# Patient Record
Sex: MALE | Race: WHITE | Employment: OTHER | URBAN - METROPOLITAN AREA
[De-identification: names, ages, dates, MRNs, and addresses within clinical notes are randomized per-mention and may not be internally consistent; named-entity substitution may affect disease eponyms.]

---

## 2017-02-07 PROBLEM — I50.22 CHRONIC SYSTOLIC CHF (CONGESTIVE HEART FAILURE) (HCC): Status: ACTIVE | Noted: 2017-02-07

## 2017-02-07 PROBLEM — I34.0 MODERATE MITRAL REGURGITATION: Status: ACTIVE | Noted: 2017-02-07

## 2017-02-07 PROBLEM — I42.0 NONISCHEMIC DILATED CARDIOMYOPATHY (HCC): Status: ACTIVE | Noted: 2017-02-07

## 2017-04-06 ENCOUNTER — HOSPITAL ENCOUNTER (EMERGENCY)
Facility: HOSPITAL | Age: 62
Discharge: HOME | End: 2017-04-06
Attending: EMERGENCY MEDICINE
Payer: COMMERCIAL

## 2017-04-06 ENCOUNTER — APPOINTMENT (OUTPATIENT)
Dept: CT IMAGING | Facility: HOSPITAL | Age: 62
End: 2017-04-06
Attending: EMERGENCY MEDICINE
Payer: COMMERCIAL

## 2017-04-06 VITALS
HEART RATE: 84 BPM | SYSTOLIC BLOOD PRESSURE: 142 MMHG | TEMPERATURE: 97 F | RESPIRATION RATE: 16 BRPM | WEIGHT: 180 LBS | OXYGEN SATURATION: 98 % | BODY MASS INDEX: 25.77 KG/M2 | HEIGHT: 70 IN | DIASTOLIC BLOOD PRESSURE: 82 MMHG

## 2017-04-06 DIAGNOSIS — N20.0 KIDNEY STONE: Primary | ICD-10-CM

## 2017-04-06 PROCEDURE — 96376 TX/PRO/DX INJ SAME DRUG ADON: CPT

## 2017-04-06 PROCEDURE — 99284 EMERGENCY DEPT VISIT MOD MDM: CPT

## 2017-04-06 PROCEDURE — 80053 COMPREHEN METABOLIC PANEL: CPT | Performed by: EMERGENCY MEDICINE

## 2017-04-06 PROCEDURE — 85025 COMPLETE CBC W/AUTO DIFF WBC: CPT | Performed by: EMERGENCY MEDICINE

## 2017-04-06 PROCEDURE — 96374 THER/PROPH/DIAG INJ IV PUSH: CPT

## 2017-04-06 PROCEDURE — 96375 TX/PRO/DX INJ NEW DRUG ADDON: CPT

## 2017-04-06 PROCEDURE — 81001 URINALYSIS AUTO W/SCOPE: CPT | Performed by: EMERGENCY MEDICINE

## 2017-04-06 PROCEDURE — 96361 HYDRATE IV INFUSION ADD-ON: CPT

## 2017-04-06 PROCEDURE — 83690 ASSAY OF LIPASE: CPT | Performed by: EMERGENCY MEDICINE

## 2017-04-06 PROCEDURE — 74176 CT ABD & PELVIS W/O CONTRAST: CPT

## 2017-04-06 RX ORDER — HYDROCODONE BITARTRATE AND ACETAMINOPHEN 10; 325 MG/1; MG/1
1 TABLET ORAL EVERY 4 HOURS PRN
Qty: 20 TABLET | Refills: 0 | Status: SHIPPED | OUTPATIENT
Start: 2017-04-06 | End: 2017-05-11

## 2017-04-06 RX ORDER — ONDANSETRON 4 MG/1
4 TABLET, ORALLY DISINTEGRATING ORAL EVERY 4 HOURS PRN
Qty: 20 TABLET | Refills: 0 | Status: SHIPPED | OUTPATIENT
Start: 2017-04-06 | End: 2017-05-11

## 2017-04-06 RX ORDER — HYDROMORPHONE HYDROCHLORIDE 1 MG/ML
1 INJECTION, SOLUTION INTRAMUSCULAR; INTRAVENOUS; SUBCUTANEOUS EVERY 30 MIN PRN
Status: COMPLETED | OUTPATIENT
Start: 2017-04-06 | End: 2017-04-06

## 2017-04-06 RX ORDER — TAMSULOSIN HYDROCHLORIDE 0.4 MG/1
0.4 CAPSULE ORAL DAILY
Qty: 7 CAPSULE | Refills: 0 | Status: SHIPPED | OUTPATIENT
Start: 2017-04-06 | End: 2017-04-13

## 2017-04-06 RX ORDER — ONDANSETRON 2 MG/ML
4 INJECTION INTRAMUSCULAR; INTRAVENOUS ONCE
Status: COMPLETED | OUTPATIENT
Start: 2017-04-06 | End: 2017-04-06

## 2017-04-06 RX ORDER — HYDROMORPHONE HYDROCHLORIDE 1 MG/ML
1 INJECTION, SOLUTION INTRAMUSCULAR; INTRAVENOUS; SUBCUTANEOUS ONCE
Status: COMPLETED | OUTPATIENT
Start: 2017-04-06 | End: 2017-04-06

## 2017-04-06 NOTE — ED PROVIDER NOTES
Patient Seen in: [facility] Emergency Department    History   Patient presents with:  Abdomen/Flank Pain (GI/)    Stated Complaint: abd pain    HPI    This is a 42-year-old male who awoke with right flank pain radiating to his right side of his ab (400 mg total) by mouth daily. lisinopril 10 MG Oral Tab,  Take 1 tablet (10 mg total) by mouth daily. furosemide 20 MG Oral Tab,  Take 1 tablet (20 mg total) by mouth daily.    Metoprolol Succinate ER 25 MG Oral Tablet 24 Hr,  Take 1 tablet (25 mg tota reproduce any specific tenderness in his abdomen his abdomen is soft. EXT: There is good pulses bilaterally. There is no calf tenderness. There is no rash noted. There is no edema    NEURO: Alert and oriented x3.   Muscle strength and sensory exam is gr renal calculus. .           Urinalysis did show blood but no findings of urinary tract infection. The comprehensive shows a creatinine slightly high at 1.42. Slightly higher than normal for him.   He was given IV fluids probably secondary to the nausea and

## 2017-04-06 NOTE — ED INITIAL ASSESSMENT (HPI)
Pt reports waking up with right flank and back pain. He states he has a history of kidney stones and feels like this may also be a kidney stone.

## 2017-04-07 ENCOUNTER — APPOINTMENT (OUTPATIENT)
Dept: GENERAL RADIOLOGY | Facility: HOSPITAL | Age: 62
End: 2017-04-07
Attending: EMERGENCY MEDICINE
Payer: COMMERCIAL

## 2017-04-07 ENCOUNTER — HOSPITAL ENCOUNTER (EMERGENCY)
Facility: HOSPITAL | Age: 62
Discharge: HOME | End: 2017-04-07
Attending: EMERGENCY MEDICINE
Payer: COMMERCIAL

## 2017-04-07 VITALS
DIASTOLIC BLOOD PRESSURE: 78 MMHG | SYSTOLIC BLOOD PRESSURE: 130 MMHG | OXYGEN SATURATION: 98 % | RESPIRATION RATE: 16 BRPM | HEART RATE: 68 BPM

## 2017-04-07 DIAGNOSIS — N20.0 KIDNEY STONE: Primary | ICD-10-CM

## 2017-04-07 PROCEDURE — 99285 EMERGENCY DEPT VISIT HI MDM: CPT

## 2017-04-07 PROCEDURE — 80048 BASIC METABOLIC PNL TOTAL CA: CPT | Performed by: EMERGENCY MEDICINE

## 2017-04-07 PROCEDURE — 96374 THER/PROPH/DIAG INJ IV PUSH: CPT

## 2017-04-07 PROCEDURE — 96361 HYDRATE IV INFUSION ADD-ON: CPT

## 2017-04-07 PROCEDURE — 96375 TX/PRO/DX INJ NEW DRUG ADDON: CPT

## 2017-04-07 PROCEDURE — 85025 COMPLETE CBC W/AUTO DIFF WBC: CPT | Performed by: EMERGENCY MEDICINE

## 2017-04-07 PROCEDURE — 81001 URINALYSIS AUTO W/SCOPE: CPT | Performed by: EMERGENCY MEDICINE

## 2017-04-07 PROCEDURE — 99284 EMERGENCY DEPT VISIT MOD MDM: CPT

## 2017-04-07 PROCEDURE — 74000 XR ABDOMEN (KUB) (1 AP VIEW)  (CPT=74000): CPT

## 2017-04-07 RX ORDER — HYDROMORPHONE HYDROCHLORIDE 1 MG/ML
1 INJECTION, SOLUTION INTRAMUSCULAR; INTRAVENOUS; SUBCUTANEOUS ONCE
Status: COMPLETED | OUTPATIENT
Start: 2017-04-07 | End: 2017-04-07

## 2017-04-07 RX ORDER — HYDROCODONE BITARTRATE AND ACETAMINOPHEN 7.5; 325 MG/1; MG/1
1-2 TABLET ORAL EVERY 4 HOURS PRN
Qty: 20 TABLET | Refills: 0 | Status: SHIPPED | OUTPATIENT
Start: 2017-04-07 | End: 2017-04-14

## 2017-04-07 RX ORDER — IBUPROFEN 600 MG/1
600 TABLET ORAL EVERY 8 HOURS PRN
Qty: 30 TABLET | Refills: 0 | Status: SHIPPED | OUTPATIENT
Start: 2017-04-07 | End: 2017-04-14

## 2017-04-07 RX ORDER — SODIUM CHLORIDE 9 MG/ML
INJECTION, SOLUTION INTRAVENOUS ONCE
Status: COMPLETED | OUTPATIENT
Start: 2017-04-07 | End: 2017-04-07

## 2017-04-07 RX ORDER — KETOROLAC TROMETHAMINE 30 MG/ML
15 INJECTION, SOLUTION INTRAMUSCULAR; INTRAVENOUS ONCE
Status: COMPLETED | OUTPATIENT
Start: 2017-04-07 | End: 2017-04-07

## 2017-04-07 NOTE — ED PROVIDER NOTES
Patient Seen in: [facility] Emergency Department    History   Patient presents with:  Abdomen/Flank Pain (GI/)    Stated Complaint: renal colic    HPI    29-FSRU-TOG male with a history of lupus (not currently on prednisone close (and biventricula mg total) by mouth every 4 (four) hours as needed for Nausea. magnesium oxide 400 MG Oral Tab,  Take 1 tablet (400 mg total) by mouth daily. lisinopril 10 MG Oral Tab,  Take 1 tablet (10 mg total) by mouth daily.    furosemide 20 MG Oral Tab,  Take 1 ta Glucose 136 (*)     Creatinine 1.58 (*)     GFR 47 (*)     Sodium 135 (*)     All other components within normal limits   URINALYSIS WITH CULTURE REFLEX - Abnormal; Notable for the following:     Blood Urine Moderate (*)     RBC URINE >10 (*)     Hyaline C 4/07/2017 at 16:27     Approved by: Herberth Tucker MD            [facility] [place]     Patient hydrated with normal saline. He was given Dilaudid for pain control. He was also given 15 mg of Toradol.     Case discussed with urologist.  He does not want a CT scan janna

## 2017-04-10 PROBLEM — N20.1 RIGHT URETERAL STONE: Status: ACTIVE | Noted: 2017-04-10

## 2017-05-11 ENCOUNTER — APPOINTMENT (OUTPATIENT)
Dept: LAB | Age: 62
End: 2017-05-11
Attending: NURSE PRACTITIONER
Payer: COMMERCIAL

## 2017-05-11 DIAGNOSIS — R79.89 ELEVATED SERUM CREATININE: ICD-10-CM

## 2017-05-11 DIAGNOSIS — I42.0 DILATED CARDIOMYOPATHY (HCC): ICD-10-CM

## 2017-05-11 DIAGNOSIS — I50.22 CHRONIC SYSTOLIC CONGESTIVE HEART FAILURE (HCC): ICD-10-CM

## 2017-05-11 PROCEDURE — 80048 BASIC METABOLIC PNL TOTAL CA: CPT

## 2017-05-11 PROCEDURE — 36415 COLL VENOUS BLD VENIPUNCTURE: CPT

## 2017-06-25 ENCOUNTER — APPOINTMENT (OUTPATIENT)
Dept: CT IMAGING | Facility: HOSPITAL | Age: 62
End: 2017-06-25
Attending: EMERGENCY MEDICINE
Payer: COMMERCIAL

## 2017-06-25 ENCOUNTER — HOSPITAL ENCOUNTER (EMERGENCY)
Facility: HOSPITAL | Age: 62
Discharge: HOME | End: 2017-06-25
Attending: EMERGENCY MEDICINE
Payer: COMMERCIAL

## 2017-06-25 VITALS
WEIGHT: 185 LBS | OXYGEN SATURATION: 100 % | HEART RATE: 71 BPM | RESPIRATION RATE: 18 BRPM | DIASTOLIC BLOOD PRESSURE: 75 MMHG | BODY MASS INDEX: 26.48 KG/M2 | HEIGHT: 70 IN | SYSTOLIC BLOOD PRESSURE: 119 MMHG | TEMPERATURE: 98 F

## 2017-06-25 DIAGNOSIS — N23 RENAL COLIC: Primary | ICD-10-CM

## 2017-06-25 PROCEDURE — 96376 TX/PRO/DX INJ SAME DRUG ADON: CPT

## 2017-06-25 PROCEDURE — 80053 COMPREHEN METABOLIC PANEL: CPT

## 2017-06-25 PROCEDURE — 99284 EMERGENCY DEPT VISIT MOD MDM: CPT

## 2017-06-25 PROCEDURE — 81003 URINALYSIS AUTO W/O SCOPE: CPT | Performed by: EMERGENCY MEDICINE

## 2017-06-25 PROCEDURE — 85025 COMPLETE CBC W/AUTO DIFF WBC: CPT | Performed by: EMERGENCY MEDICINE

## 2017-06-25 PROCEDURE — 74176 CT ABD & PELVIS W/O CONTRAST: CPT | Performed by: EMERGENCY MEDICINE

## 2017-06-25 PROCEDURE — 83690 ASSAY OF LIPASE: CPT

## 2017-06-25 PROCEDURE — 80053 COMPREHEN METABOLIC PANEL: CPT | Performed by: EMERGENCY MEDICINE

## 2017-06-25 PROCEDURE — 99285 EMERGENCY DEPT VISIT HI MDM: CPT

## 2017-06-25 PROCEDURE — 96374 THER/PROPH/DIAG INJ IV PUSH: CPT

## 2017-06-25 PROCEDURE — 83690 ASSAY OF LIPASE: CPT | Performed by: EMERGENCY MEDICINE

## 2017-06-25 PROCEDURE — 96375 TX/PRO/DX INJ NEW DRUG ADDON: CPT

## 2017-06-25 PROCEDURE — 85025 COMPLETE CBC W/AUTO DIFF WBC: CPT

## 2017-06-25 RX ORDER — ONDANSETRON 4 MG/1
4 TABLET, ORALLY DISINTEGRATING ORAL EVERY 4 HOURS PRN
Qty: 10 TABLET | Refills: 0 | Status: SHIPPED | OUTPATIENT
Start: 2017-06-25 | End: 2017-07-02

## 2017-06-25 RX ORDER — HYDROCODONE BITARTRATE AND ACETAMINOPHEN 5; 325 MG/1; MG/1
1-2 TABLET ORAL EVERY 4 HOURS PRN
Qty: 20 TABLET | Refills: 0 | Status: SHIPPED | OUTPATIENT
Start: 2017-06-25 | End: 2017-07-02

## 2017-06-25 RX ORDER — ONDANSETRON 2 MG/ML
4 INJECTION INTRAMUSCULAR; INTRAVENOUS ONCE
Status: COMPLETED | OUTPATIENT
Start: 2017-06-25 | End: 2017-06-25

## 2017-06-25 RX ORDER — HYDROMORPHONE HYDROCHLORIDE 1 MG/ML
0.5 INJECTION, SOLUTION INTRAMUSCULAR; INTRAVENOUS; SUBCUTANEOUS EVERY 30 MIN PRN
Status: DISCONTINUED | OUTPATIENT
Start: 2017-06-25 | End: 2017-06-25

## 2017-06-25 NOTE — ED PROVIDER NOTES
Patient Seen in: [facility] Emergency Department    History   Patient presents with:  Abdomen/Flank Pain (GI/)    Stated Complaint: kidney stone pain    HPI    60-year-old male with a history of congestive heart failure, coronary disease, lupus, h spironolactone 25 MG Oral Tab,  Take 1 tablet (25 mg total) by mouth daily. furosemide 20 MG Oral Tab,  Take 1 tablet (20 mg total) by mouth daily.        Family History   Problem Relation Age of Onset   • Cancer Father    • Cancer Mother    • Heart Disor are intact. There is no focal motor deficit noted. Skin exam: Warm to touch. Good skin turgor. No lacerations, abrasions, lesions noted.     ED Course     Labs Reviewed   COMP METABOLIC PANEL (14) - Abnormal; Notable for the following:        Result Marisa Registry. PATIENT STATED HISTORY: (As transcribed by Technologist)     FINDINGS:  KIDNEYS:  There is mild right hydronephrosis and hydroureter caused by a 5 mm stone within the right distal ureter at the UVJ.  They're nonobstructing 2 mm stones within the He is instructed to return to the emergency department if there is any worsening symptoms or any new concerns. Findings  of the patient's tests results were discussed with the patient in detail.   They were understanding of these results and their discha

## 2017-06-27 ENCOUNTER — HOSPITAL ENCOUNTER (OUTPATIENT)
Facility: HOSPITAL | Age: 62
Setting detail: HOSPITAL OUTPATIENT SURGERY
Discharge: HOME | End: 2017-06-27
Attending: UROLOGY | Admitting: UROLOGY
Payer: COMMERCIAL

## 2017-06-27 ENCOUNTER — APPOINTMENT (OUTPATIENT)
Dept: GENERAL RADIOLOGY | Facility: HOSPITAL | Age: 62
End: 2017-06-27
Attending: UROLOGY
Payer: COMMERCIAL

## 2017-06-27 ENCOUNTER — SURGERY (OUTPATIENT)
Age: 62
End: 2017-06-27

## 2017-06-27 ENCOUNTER — ANESTHESIA (OUTPATIENT)
Dept: SURGERY | Facility: HOSPITAL | Age: 62
End: 2017-06-27
Payer: COMMERCIAL

## 2017-06-27 ENCOUNTER — ANESTHESIA EVENT (OUTPATIENT)
Dept: SURGERY | Facility: HOSPITAL | Age: 62
End: 2017-06-27
Payer: COMMERCIAL

## 2017-06-27 VITALS
HEIGHT: 70 IN | WEIGHT: 183.63 LBS | SYSTOLIC BLOOD PRESSURE: 136 MMHG | OXYGEN SATURATION: 97 % | TEMPERATURE: 98 F | RESPIRATION RATE: 16 BRPM | BODY MASS INDEX: 26.29 KG/M2 | HEART RATE: 64 BPM | DIASTOLIC BLOOD PRESSURE: 77 MMHG

## 2017-06-27 DIAGNOSIS — N20.1 RIGHT URETERAL CALCULUS: ICD-10-CM

## 2017-06-27 LAB
ATRIAL RATE: 65 BPM
P AXIS: 19 DEGREES
P-R INTERVAL: 146 MS
Q-T INTERVAL: 436 MS
QRS DURATION: 82 MS
QTC CALCULATION (BEZET): 453 MS
R AXIS: -15 DEGREES
T AXIS: -26 DEGREES
VENTRICULAR RATE: 65 BPM

## 2017-06-27 PROCEDURE — 0TF68ZZ FRAGMENTATION IN RIGHT URETER, VIA NATURAL OR ARTIFICIAL OPENING ENDOSCOPIC: ICD-10-PCS | Performed by: UROLOGY

## 2017-06-27 PROCEDURE — 93010 ELECTROCARDIOGRAM REPORT: CPT | Performed by: INTERNAL MEDICINE

## 2017-06-27 PROCEDURE — 76000 FLUOROSCOPY <1 HR PHYS/QHP: CPT | Performed by: UROLOGY

## 2017-06-27 PROCEDURE — 0T768DZ DILATION OF RIGHT URETER WITH INTRALUMINAL DEVICE, VIA NATURAL OR ARTIFICIAL OPENING ENDOSCOPIC: ICD-10-PCS | Performed by: UROLOGY

## 2017-06-27 PROCEDURE — 82365 CALCULUS SPECTROSCOPY: CPT | Performed by: UROLOGY

## 2017-06-27 PROCEDURE — 93005 ELECTROCARDIOGRAM TRACING: CPT

## 2017-06-27 DEVICE — STENT CONTOUR URET 6X26: Type: IMPLANTABLE DEVICE | Site: URETER | Status: FUNCTIONAL

## 2017-06-27 RX ORDER — HYDROCODONE BITARTRATE AND ACETAMINOPHEN 5; 325 MG/1; MG/1
2 TABLET ORAL AS NEEDED
Status: DISCONTINUED | OUTPATIENT
Start: 2017-06-27 | End: 2017-08-04

## 2017-06-27 RX ORDER — METOCLOPRAMIDE HYDROCHLORIDE 5 MG/ML
10 INJECTION INTRAMUSCULAR; INTRAVENOUS AS NEEDED
Status: DISCONTINUED | OUTPATIENT
Start: 2017-06-27 | End: 2017-08-04

## 2017-06-27 RX ORDER — CEFAZOLIN SODIUM 1 G/3ML
INJECTION, POWDER, FOR SOLUTION INTRAMUSCULAR; INTRAVENOUS
Status: DISCONTINUED | OUTPATIENT
Start: 2017-06-27 | End: 2017-06-27 | Stop reason: HOSPADM

## 2017-06-27 RX ORDER — SODIUM CHLORIDE, SODIUM LACTATE, POTASSIUM CHLORIDE, CALCIUM CHLORIDE 600; 310; 30; 20 MG/100ML; MG/100ML; MG/100ML; MG/100ML
INJECTION, SOLUTION INTRAVENOUS CONTINUOUS
Status: DISCONTINUED | OUTPATIENT
Start: 2017-06-27 | End: 2017-08-04

## 2017-06-27 RX ORDER — ONDANSETRON 2 MG/ML
4 INJECTION INTRAMUSCULAR; INTRAVENOUS AS NEEDED
Status: DISCONTINUED | OUTPATIENT
Start: 2017-06-27 | End: 2017-08-04

## 2017-06-27 RX ORDER — DIAZEPAM 5 MG/1
5 TABLET ORAL EVERY 8 HOURS PRN
Status: DISCONTINUED | OUTPATIENT
Start: 2017-06-27 | End: 2017-08-04

## 2017-06-27 RX ORDER — NALOXONE HYDROCHLORIDE 0.4 MG/ML
80 INJECTION, SOLUTION INTRAMUSCULAR; INTRAVENOUS; SUBCUTANEOUS AS NEEDED
Status: ACTIVE | OUTPATIENT
Start: 2017-06-27 | End: 2017-06-27

## 2017-06-27 RX ORDER — METHENAMINE, SODIUM PHOSPHATE, MONOBASIC, MONOHYDRATE, PHENYL SALICYLATE, METHYLENE BLUE, AND HYOSCYAMINE SULFATE 120; 40.8; 36; 10; .12 MG/1; MG/1; MG/1; MG/1; MG/1
1 CAPSULE ORAL 3 TIMES DAILY
Qty: 15 CAPSULE | Refills: 3 | Status: SHIPPED | OUTPATIENT
Start: 2017-06-27 | End: 2017-07-12

## 2017-06-27 RX ORDER — MEPERIDINE HYDROCHLORIDE 25 MG/ML
12.5 INJECTION INTRAMUSCULAR; INTRAVENOUS; SUBCUTANEOUS AS NEEDED
Status: DISCONTINUED | OUTPATIENT
Start: 2017-06-27 | End: 2017-08-04

## 2017-06-27 RX ORDER — HYDROCODONE BITARTRATE AND ACETAMINOPHEN 5; 325 MG/1; MG/1
1 TABLET ORAL AS NEEDED
Status: DISCONTINUED | OUTPATIENT
Start: 2017-06-27 | End: 2017-08-04

## 2017-06-27 RX ORDER — HYDROMORPHONE HYDROCHLORIDE 1 MG/ML
0.4 INJECTION, SOLUTION INTRAMUSCULAR; INTRAVENOUS; SUBCUTANEOUS EVERY 5 MIN PRN
Status: ACTIVE | OUTPATIENT
Start: 2017-06-27 | End: 2017-06-27

## 2017-06-27 RX ORDER — MIDAZOLAM HYDROCHLORIDE 1 MG/ML
1 INJECTION INTRAMUSCULAR; INTRAVENOUS EVERY 5 MIN PRN
Status: DISCONTINUED | OUTPATIENT
Start: 2017-06-27 | End: 2017-08-04

## 2017-06-27 RX ORDER — TAMSULOSIN HYDROCHLORIDE 0.4 MG/1
0.4 CAPSULE ORAL EVERY EVENING
Qty: 10 CAPSULE | Refills: 0 | Status: SHIPPED | OUTPATIENT
Start: 2017-06-27 | End: 2017-07-07

## 2017-06-27 RX ORDER — LEVOFLOXACIN 500 MG/1
500 TABLET, FILM COATED ORAL DAILY
Qty: 5 TABLET | Refills: 0 | Status: SHIPPED | OUTPATIENT
Start: 2017-06-27 | End: 2017-06-29

## 2017-06-27 RX ORDER — LIDOCAINE HYDROCHLORIDE 20 MG/ML
JELLY TOPICAL AS NEEDED
Status: DISCONTINUED | OUTPATIENT
Start: 2017-06-27 | End: 2017-06-27 | Stop reason: HOSPADM

## 2017-06-27 NOTE — H&P
History & Physical Examination    Patient Name: Edmond Robin  MRN: [id number]  [facility]: [id number]  YOB: 1955    Diagnosis: right ureteral stone with obstruction    Present Illness:  right ureteral stone with obstruction      Prescriptions Prior ANGIO*  No date: COLONOSCOPY  No date: OTHER SURGICAL HISTORY      Comment: lithotripsy  7/2/15: OTHER SURGICAL HISTORY      Comment: Cysto/ stent removal  Dr. Leung Mins  No date: DIONI - CARDIO (EXTERNAL)  Family History   Problem Relation Age of Onset   • Canc

## 2017-06-27 NOTE — ANESTHESIA PREPROCEDURE EVALUATION
PRE-OP EVALUATION    Patient Name: Huy Dangelo    Pre-op Diagnosis: Right Ureteral Calculus    Procedure(s):  CYSTOSCOPY, RIGHT URETEROSCOPY, LASER LITHOTRIPSY      Surgeon(s) and Role:     Marcelino Gonzalez MD - Primary    Pre-op vitals reviewed.   Tem with pronounced hypokinesis of the septal wall. 3. This is a normal exercise EKG.        elisha Muhammad MD 11/23/2016 17:08  t   [id number] 11/25/2016 12:29 PM #[id number]    ECG reviewed.   Exercise tolerance: good     MET: >4                   (+) valvular GLU 96 06/25/2017   CA 8.6 06/25/2017            Airway      Mallampati: II  Mouth opening: >3 FB  TM distance: 4 - 6 cm  Neck ROM: full Cardiovascular      Rhythm: regular  Rate: normal     Dental      Dental appliance(s): upper dentures       Pulmonary

## 2017-06-27 NOTE — ANESTHESIA POSTPROCEDURE EVALUATION
1416 Regino Shi Patient Status:  Hospital Outpatient Surgery   Age/Gender 58year old male MRN [id number]   [facility] SURGERY Attending Naye Doyle MD   Hosp Day # 0 PCP María Castro MD       Anesthesia Post-op N

## 2017-06-27 NOTE — BRIEF OP NOTE
Pre-Operative Diagnosis: Right Ureteral Calculus     Post-Operative Diagnosis: Right Ureteral Calculus     Procedure Performed:   Procedure(s):  CYSTOSCOPY, RIGHT URETEROSCOPY, LASER LITHOTRIPSY, RIGHT URETERAL STENT, FLUOROSCOPY      Surgeon(s) and Rol

## 2017-06-28 NOTE — OPERATIVE REPORT
[facility]    PATIENT'S NAME: Nabeel Verma   ATTENDING PHYSICIAN: Carolina Valdovinos M.D. OPERATING PHYSICIAN: Carolina Valdovinos M.D.    PATIENT ACCOUNT#:   [de-identified]    LOCATION:  PREOPASCC  PRE ASCC 6 EDWP 10  MEDICAL RECORD #:   [id number]       DATE OF fluoroscopic guidance. The cystoscope and open-ended catheter were then withdrawn and a Linden dilator was used to dilate the ureter.   A semi-rigid ureteroscope was then advanced through the urethra into the bladder into the distal ureter with a secon

## 2017-06-30 LAB
CALCULI MASS: 4 MG
CALCULI NUMBER: 1

## 2017-08-04 ENCOUNTER — APPOINTMENT (OUTPATIENT)
Dept: GENERAL RADIOLOGY | Facility: HOSPITAL | Age: 62
DRG: 227 | End: 2017-08-04
Attending: EMERGENCY MEDICINE
Payer: COMMERCIAL

## 2017-08-04 ENCOUNTER — APPOINTMENT (OUTPATIENT)
Dept: CT IMAGING | Facility: HOSPITAL | Age: 62
DRG: 227 | End: 2017-08-04
Attending: EMERGENCY MEDICINE
Payer: COMMERCIAL

## 2017-08-04 ENCOUNTER — HOSPITAL ENCOUNTER (INPATIENT)
Facility: HOSPITAL | Age: 62
LOS: 4 days | Discharge: HOME | DRG: 227 | End: 2017-08-09
Attending: EMERGENCY MEDICINE | Admitting: HOSPITALIST
Payer: COMMERCIAL

## 2017-08-04 DIAGNOSIS — N17.9 ACUTE RENAL FAILURE, UNSPECIFIED ACUTE RENAL FAILURE TYPE (HCC): ICD-10-CM

## 2017-08-04 DIAGNOSIS — R55 SYNCOPE AND COLLAPSE: ICD-10-CM

## 2017-08-04 DIAGNOSIS — T79.6XXA TRAUMATIC RHABDOMYOLYSIS, INITIAL ENCOUNTER (HCC): ICD-10-CM

## 2017-08-04 DIAGNOSIS — R77.8 TROPONIN LEVEL ELEVATED: Primary | ICD-10-CM

## 2017-08-04 LAB
ALBUMIN SERPL-MCNC: 3.1 G/DL (ref 3.5–4.8)
ALP LIVER SERPL-CCNC: 95 U/L (ref 45–117)
ALT SERPL-CCNC: 118 U/L (ref 17–63)
AST SERPL-CCNC: 284 U/L (ref 15–41)
BASOPHILS # BLD AUTO: 0.02 X10(3) UL (ref 0–0.1)
BASOPHILS NFR BLD AUTO: 0.2 %
BILIRUB SERPL-MCNC: 3 MG/DL (ref 0.1–2)
BUN BLD-MCNC: 50 MG/DL (ref 8–20)
CALCIUM BLD-MCNC: 8.6 MG/DL (ref 8.3–10.3)
CHLORIDE: 103 MMOL/L (ref 101–111)
CK: 2937 IU/L (ref 39–308)
CKMB: 8.3 NG/ML (ref 0–5)
CO2: 21 MMOL/L (ref 22–32)
CREAT BLD-MCNC: 1.61 MG/DL (ref 0.7–1.3)
EOSINOPHIL # BLD AUTO: 0.03 X10(3) UL (ref 0–0.3)
EOSINOPHIL NFR BLD AUTO: 0.3 %
ERYTHROCYTE [DISTWIDTH] IN BLOOD BY AUTOMATED COUNT: 11.9 % (ref 11.5–16)
GLUCOSE BLD-MCNC: 68 MG/DL (ref 70–99)
HCT VFR BLD AUTO: 43.5 % (ref 37–53)
HGB BLD-MCNC: 14.8 G/DL (ref 13–17)
IMMATURE GRANULOCYTE COUNT: 0.1 X10(3) UL (ref 0–1)
IMMATURE GRANULOCYTE RATIO %: 1.1 %
LYMPHOCYTES # BLD AUTO: 0.9 X10(3) UL (ref 0.9–4)
LYMPHOCYTES NFR BLD AUTO: 9.6 %
M PROTEIN MFR SERPL ELPH: 7.3 G/DL (ref 6.1–8.3)
MB INDEX: <1 % (ref ?–4)
MCH RBC QN AUTO: 31.2 PG (ref 27–33.2)
MCHC RBC AUTO-ENTMCNC: 34 G/DL (ref 31–37)
MCV RBC AUTO: 91.6 FL (ref 80–99)
MONOCYTES # BLD AUTO: 0.89 X10(3) UL (ref 0.1–0.6)
MONOCYTES NFR BLD AUTO: 9.5 %
NEUTROPHIL ABS PRELIM: 7.41 X10 (3) UL (ref 1.3–6.7)
NEUTROPHILS # BLD AUTO: 7.41 X10(3) UL (ref 1.3–6.7)
NEUTROPHILS NFR BLD AUTO: 79.3 %
PLATELET # BLD AUTO: 194 10(3)UL (ref 150–450)
POTASSIUM SERPL-SCNC: 4.1 MMOL/L (ref 3.6–5.1)
RBC # BLD AUTO: 4.75 X10(6)UL (ref 4.3–5.7)
RED CELL DISTRIBUTION WIDTH-SD: 39.9 FL (ref 35.1–46.3)
SODIUM SERPL-SCNC: 138 MMOL/L (ref 136–144)
TROPONIN: 0.12 NG/ML (ref ?–0.05)
WBC # BLD AUTO: 9.4 X10(3) UL (ref 4–13)

## 2017-08-04 PROCEDURE — 71010 XR CHEST AP PORTABLE  (CPT=71010): CPT | Performed by: EMERGENCY MEDICINE

## 2017-08-04 PROCEDURE — 93010 ELECTROCARDIOGRAM REPORT: CPT | Performed by: INTERNAL MEDICINE

## 2017-08-04 PROCEDURE — 70450 CT HEAD/BRAIN W/O DYE: CPT | Performed by: EMERGENCY MEDICINE

## 2017-08-04 RX ORDER — MORPHINE SULFATE 4 MG/ML
4 INJECTION, SOLUTION INTRAMUSCULAR; INTRAVENOUS ONCE
Status: COMPLETED | OUTPATIENT
Start: 2017-08-04 | End: 2017-08-04

## 2017-08-05 ENCOUNTER — APPOINTMENT (OUTPATIENT)
Dept: CV DIAGNOSTICS | Facility: HOSPITAL | Age: 62
DRG: 227 | End: 2017-08-05
Attending: HOSPITALIST
Payer: COMMERCIAL

## 2017-08-05 ENCOUNTER — APPOINTMENT (OUTPATIENT)
Dept: ULTRASOUND IMAGING | Facility: HOSPITAL | Age: 62
DRG: 227 | End: 2017-08-05
Attending: HOSPITALIST
Payer: COMMERCIAL

## 2017-08-05 PROBLEM — R55 SYNCOPE AND COLLAPSE: Status: ACTIVE | Noted: 2017-08-05

## 2017-08-05 PROBLEM — T79.6XXA TRAUMATIC RHABDOMYOLYSIS, INITIAL ENCOUNTER (HCC): Status: ACTIVE | Noted: 2017-08-05

## 2017-08-05 PROBLEM — N17.9 ACUTE RENAL FAILURE, UNSPECIFIED ACUTE RENAL FAILURE TYPE (HCC): Status: ACTIVE | Noted: 2017-08-05

## 2017-08-05 LAB
ALBUMIN SERPL-MCNC: 2.7 G/DL (ref 3.5–4.8)
ALP LIVER SERPL-CCNC: 78 U/L (ref 45–117)
ALT SERPL-CCNC: 96 U/L (ref 17–63)
AST SERPL-CCNC: 191 U/L (ref 15–41)
ATRIAL RATE: 87 BPM
ATRIAL RATE: 90 BPM
ATRIAL RATE: 93 BPM
BASOPHILS # BLD AUTO: 0.01 X10(3) UL (ref 0–0.1)
BASOPHILS NFR BLD AUTO: 0.1 %
BILIRUB SERPL-MCNC: 2.5 MG/DL (ref 0.1–2)
BUN BLD-MCNC: 44 MG/DL (ref 8–20)
CALCIUM BLD-MCNC: 7.9 MG/DL (ref 8.3–10.3)
CHLORIDE: 109 MMOL/L (ref 101–111)
CO2: 21 MMOL/L (ref 22–32)
CREAT BLD-MCNC: 1.3 MG/DL (ref 0.7–1.3)
EOSINOPHIL # BLD AUTO: 0.06 X10(3) UL (ref 0–0.3)
EOSINOPHIL NFR BLD AUTO: 0.8 %
ERYTHROCYTE [DISTWIDTH] IN BLOOD BY AUTOMATED COUNT: 12 % (ref 11.5–16)
GLUCOSE BLD-MCNC: 89 MG/DL (ref 70–99)
HAV IGM SER QL: 2.2 MG/DL (ref 1.7–3)
HCT VFR BLD AUTO: 36.1 % (ref 37–53)
HGB BLD-MCNC: 11.9 G/DL (ref 13–17)
IMMATURE GRANULOCYTE COUNT: 0.12 X10(3) UL (ref 0–1)
IMMATURE GRANULOCYTE RATIO %: 1.7 %
LYMPHOCYTES # BLD AUTO: 1 X10(3) UL (ref 0.9–4)
LYMPHOCYTES NFR BLD AUTO: 14 %
M PROTEIN MFR SERPL ELPH: 6.1 G/DL (ref 6.1–8.3)
MCH RBC QN AUTO: 31.2 PG (ref 27–33.2)
MCHC RBC AUTO-ENTMCNC: 33 G/DL (ref 31–37)
MCV RBC AUTO: 94.5 FL (ref 80–99)
MONOCYTES # BLD AUTO: 0.9 X10(3) UL (ref 0.1–0.6)
MONOCYTES NFR BLD AUTO: 12.6 %
NEUTROPHIL ABS PRELIM: 5.07 X10 (3) UL (ref 1.3–6.7)
NEUTROPHILS # BLD AUTO: 5.07 X10(3) UL (ref 1.3–6.7)
NEUTROPHILS NFR BLD AUTO: 70.8 %
P AXIS: 29 DEGREES
P AXIS: 31 DEGREES
P AXIS: 34 DEGREES
P-R INTERVAL: 142 MS
P-R INTERVAL: 148 MS
P-R INTERVAL: 150 MS
PLATELET # BLD AUTO: 155 10(3)UL (ref 150–450)
POTASSIUM SERPL-SCNC: 3.3 MMOL/L (ref 3.6–5.1)
POTASSIUM SERPL-SCNC: 5 MMOL/L (ref 3.6–5.1)
Q-T INTERVAL: 376 MS
Q-T INTERVAL: 394 MS
Q-T INTERVAL: 396 MS
QRS DURATION: 80 MS
QRS DURATION: 84 MS
QRS DURATION: 84 MS
QTC CALCULATION (BEZET): 467 MS
QTC CALCULATION (BEZET): 476 MS
QTC CALCULATION (BEZET): 481 MS
R AXIS: -1 DEGREES
R AXIS: -5 DEGREES
R AXIS: -9 DEGREES
RBC # BLD AUTO: 3.82 X10(6)UL (ref 4.3–5.7)
RED CELL DISTRIBUTION WIDTH-SD: 41.7 FL (ref 35.1–46.3)
SODIUM SERPL-SCNC: 142 MMOL/L (ref 136–144)
T AXIS: 24 DEGREES
T AXIS: 24 DEGREES
T AXIS: 32 DEGREES
TROPONIN: 0.09 NG/ML (ref ?–0.05)
TROPONIN: 0.11 NG/ML (ref ?–0.05)
VENTRICULAR RATE: 87 BPM
VENTRICULAR RATE: 90 BPM
VENTRICULAR RATE: 93 BPM
WBC # BLD AUTO: 7.2 X10(3) UL (ref 4–13)

## 2017-08-05 PROCEDURE — 76700 US EXAM ABDOM COMPLETE: CPT | Performed by: HOSPITALIST

## 2017-08-05 PROCEDURE — 93306 TTE W/DOPPLER COMPLETE: CPT | Performed by: HOSPITALIST

## 2017-08-05 RX ORDER — HYDROCODONE BITARTRATE AND ACETAMINOPHEN 5; 325 MG/1; MG/1
1 TABLET ORAL EVERY 4 HOURS PRN
Status: DISCONTINUED | OUTPATIENT
Start: 2017-08-05 | End: 2017-08-09

## 2017-08-05 RX ORDER — SODIUM CHLORIDE 9 MG/ML
INJECTION, SOLUTION INTRAVENOUS CONTINUOUS
Status: ACTIVE | OUTPATIENT
Start: 2017-08-05 | End: 2017-08-05

## 2017-08-05 RX ORDER — HYDROCODONE BITARTRATE AND ACETAMINOPHEN 5; 325 MG/1; MG/1
2 TABLET ORAL EVERY 4 HOURS PRN
Status: DISCONTINUED | OUTPATIENT
Start: 2017-08-05 | End: 2017-08-09

## 2017-08-05 RX ORDER — MORPHINE SULFATE 4 MG/ML
1 INJECTION, SOLUTION INTRAMUSCULAR; INTRAVENOUS EVERY 2 HOUR PRN
Status: DISCONTINUED | OUTPATIENT
Start: 2017-08-05 | End: 2017-08-09

## 2017-08-05 RX ORDER — ONDANSETRON 2 MG/ML
4 INJECTION INTRAMUSCULAR; INTRAVENOUS EVERY 6 HOURS PRN
Status: DISCONTINUED | OUTPATIENT
Start: 2017-08-05 | End: 2017-08-09

## 2017-08-05 RX ORDER — MORPHINE SULFATE 4 MG/ML
2 INJECTION, SOLUTION INTRAMUSCULAR; INTRAVENOUS EVERY 2 HOUR PRN
Status: DISCONTINUED | OUTPATIENT
Start: 2017-08-05 | End: 2017-08-09

## 2017-08-05 RX ORDER — POTASSIUM CHLORIDE 20 MEQ/1
40 TABLET, EXTENDED RELEASE ORAL EVERY 4 HOURS
Status: COMPLETED | OUTPATIENT
Start: 2017-08-05 | End: 2017-08-05

## 2017-08-05 RX ORDER — POTASSIUM CHLORIDE 1.5 G/1.77G
40 POWDER, FOR SOLUTION ORAL DAILY
COMMUNITY
End: 2017-11-07

## 2017-08-05 RX ORDER — ONDANSETRON 2 MG/ML
4 INJECTION INTRAMUSCULAR; INTRAVENOUS EVERY 4 HOURS PRN
Status: DISCONTINUED | OUTPATIENT
Start: 2017-08-05 | End: 2017-08-08

## 2017-08-05 RX ORDER — HEPARIN SODIUM 5000 [USP'U]/ML
5000 INJECTION, SOLUTION INTRAVENOUS; SUBCUTANEOUS EVERY 8 HOURS SCHEDULED
Status: DISCONTINUED | OUTPATIENT
Start: 2017-08-05 | End: 2017-08-09

## 2017-08-05 RX ORDER — HYDROMORPHONE HYDROCHLORIDE 1 MG/ML
0.5 INJECTION, SOLUTION INTRAMUSCULAR; INTRAVENOUS; SUBCUTANEOUS EVERY 30 MIN PRN
Status: ACTIVE | OUTPATIENT
Start: 2017-08-05 | End: 2017-08-05

## 2017-08-05 RX ORDER — SODIUM CHLORIDE 9 MG/ML
INJECTION, SOLUTION INTRAVENOUS CONTINUOUS
Status: DISCONTINUED | OUTPATIENT
Start: 2017-08-05 | End: 2017-08-07

## 2017-08-05 NOTE — PROGRESS NOTES
08/05/17 0505 08/05/17 0508 08/05/17 0512   Vital Signs   Pulse 85 97 133   Heart Rate Source Monitor Monitor Monitor   Cardiac Rhythm NSR --  --    Resp 22 --  --    Respiratory Quality Normal --  --    BP 96/73 104/56 (!) 81/51   BP Location Left arm

## 2017-08-05 NOTE — CONSULTS
Cardiology Consult Note      SUBJECTIVE:    Reason for Consultation: dilated cardiomyopathy and syncope    History of Present Illness: Patient is 58year old male with a dilated cardiomyopathy hospitalized 2015 and cath showing mild ashd and moderate mr. injection 4 mg 4 mg Intravenous Q6H PRN   0.9%  NaCl infusion  Intravenous Continuous   Heparin Sodium (Porcine) 5000 UNIT/ML injection 5,000 Units 5,000 Units Subcutaneous Q8H [address]   morphINE sulfate (PF) 4 MG/ML injection 1 mg 1 mg Intravenous Q2H PRN   O for the past 24 hrs:   BP Temp Temp src Pulse Resp SpO2 Height Weight   08/05/17 0552 - - - 89 - - - 167 lb 5.3 oz (75.9 kg)   08/05/17 0512 (!) 81/51 - - 133 - - - -   08/05/17 0508 104/56 - - 97 - - - -   08/05/17 0505 96/73 97.8 °F (36.6 °C) Oral 85 22 (21232) on 6/27/2017 1:23:06 PM   CXR: PROCEDURE:  XR CHEST AP PORTABLE (CPT=71010)     TECHNIQUE:  AP chest radiograph was obtained. COMPARISON:  [facility] , XR CHEST PA + LAT CHEST (CPT=71020), 11/21/2016, 11:14.      INDICATIONS:  Chest pain     PATIENT Left ventriculogram.  3.                    Right heart catheterization through the right femoral vein.        PROCEDURE:  After written informed consent was obtained from the patient, he was brought to the cardiac catheterization laboratory.   He was prepp in sinus rhythm and occasional isolated PVCs, but no prognostically significant arrhythmia.     SELECTIVE CORONARY ANGIOGRAPHY:  The left main coronary artery had no disease angiographically.   LAD artery and diagonal branches had no disease angiographicall was tuned up with IV fluids which is proved by the right heart catheterization today. 2.                    The patient would like to be discharged and there is no objection to send him home tomorrow morning We will adjust heart failure medications today. improves restart home meds  3. RENÉE secondary dehydration   Rehydrate follow bmp  4. Syncope   Monitor rhythm if ef remains depressed would consider AICD if not ep eval  5. MR secondary 2   Repeat echo if severe ? repair  6.    Orthostatic hypotensi

## 2017-08-05 NOTE — ED PROVIDER NOTES
Patient Seen in: [facility] Emergency Department    History   Patient presents with:  Altered Mental Status (neurologic)  Chest Pain Angina (cardiovascular)    Stated Complaint:     HPI    60-year-old male presents with pain to the anterior chest wa Tab,  Take 1 tablet (10 mg total) by mouth daily. furosemide 20 MG Oral Tab,  Take 1 tablet (20 mg total) by mouth daily. magnesium oxide 400 MG Oral Tab,  Take 1 tablet (400 mg total) by mouth daily.    Metoprolol Succinate ER 25 MG Oral Tablet 24 Hr, peripheral pulses   Neuro: Alert oriented and nonfocal   Skin: no rashes or nodules    ED Course     Labs Reviewed   COMP METABOLIC PANEL (14) - Abnormal; Notable for the following:        Result Value    Glucose 68 (*)     BUN 50 (*)     Creatinine 1.61 ( COMPARISON:  [facility] , CT BRAIN OR HEAD ([id number]), 10/25/2016, 10:56. INDICATIONS:  Infusion  TECHNIQUE:  Noncontrast CT scanning is performed through the brain. Dose reduction techniques were used.  Dose information is transmitted to the ACR ([employer] pneumothorax. No lobar consolidation. Degenerative changes in the spine. CONCLUSION:  No lobar pneumonia or congestive failure. Mild atelectasis/scarring at the lung bases.     Dictated by: Gabo Gruber MD on 8/04/2017 at 21:20     Approved by: Sadia Gallagher

## 2017-08-05 NOTE — PHYSICAL THERAPY NOTE
PHYSICAL THERAPY EVALUATION - INPATIENT     Room Number: 2079/8489-H  Evaluation Date: 8/5/2017  Type of Evaluation: Initial  Physician Order: PT Eval and Treat    Presenting Problem: Syncope/collapse, elevated troponin level, traumatic rhabdomyolysis, History:  No date: CATH PERCUTANEOUS  TRANSLUMINAL CORONARY ANGIO*  No date: COLONOSCOPY  06/30/2015: GLOBAL ESWL KIDNEY  No date: OTHER SURGICAL HISTORY      Comment: lithotripsy  7/2/15: OTHER SURGICAL HISTORY      Comment: Cysto/ stent removal  Dr. Hendrickson Rolling precautions    RANGE OF MOTION AND STRENGTH ASSESSMENT  Upper extremity ROM and strength are : B elbow/wrist AROM WFL; shld AROM flex/abd BUE 0-3/4; MMT BUE: shld flex 3/5, bicep 4-/5, grasp functional    Lower extremity ROM is within functional limits Flexed knee;Comment (mild kyphosis)  Stoop/Curb Assistance: Not tested  Comment : orthostatic and symptomatic    Skilled Therapy Provided: Pt presents to PT lying supine in bed c/o severe 10/10 chest pn. Pt still not due for pn medication x2 hrs per RN.  Pt what he was trying to say and he felt like everything was \"taking a lot of time\". Pt consistently requiring min A for all t/f and mobility. Pt would be challenged to return home d/t inability to complete tasks indep at this time.  Rec DC to [address] when medica

## 2017-08-05 NOTE — CM/SW NOTE
08/05/17 1000   CM/SW Referral Data   Referral Source Physician   Reason for Referral Discharge planning   Informant Patient   Pertinent Medical Hx   Primary Care Physician Name dr Jessica Garcia Drug/Alcohol Use n   [facility]

## 2017-08-05 NOTE — H&P
.  CC: Patient presents with:  Altered Mental Status (neurologic)  Chest Pain Angina (cardiovascular)       PCP: Chantale Aguero MD    History of Present Illness: Patient is a 58year old male with PMH sig for dilated cardiomyopathy/mod MR who presente mEq by mouth daily. Disp:  Rfl:    lisinopril 10 MG Oral Tab Take 1 tablet (10 mg total) by mouth daily. Disp: 30 tablet Rfl: 3   furosemide 20 MG Oral Tab Take 1 tablet (20 mg total) by mouth daily.  Disp: 90 tablet Rfl: 3   magnesium oxide 400 MG Oral Tab Recent Labs   Lab  08/04/17 2049 08/05/17   0559   TROP  0.117*  0.112*       Additional Diagnostics: personally reviewed EKG- pending    CXR: image personally reviewed- clear    Radiology: Ct Brain Or Head ([id number])    Result Date: 8/4/2017  PROCED used to evaluate the abdomen. The exam includes images of the liver, gallbladder, common bile duct, pancreas, spleen, kidneys, IVC, and aorta. FINDINGS:  LIVER:  Normal size and mildly heterogeneous echogenicity. No significant masses.  BILIARY:  Normal a seemed even lower overnight and could have contributed to syncope. In cardiology office 5/17, BP was 90/60. For now hold BP meds  - no evidence of infection on cxr. Will check u/a  - abd u/s with only sludge in gallbladder.     3) Elevated CK- 2/2 syncope/f

## 2017-08-05 NOTE — PLAN OF CARE
Received pt from er w/ complaint of anteriol chest wall pain. Had syncopal episode at home. Pt stated not sure how many days was he lost consciousness. No recollection on the events prior to syncope. Denies sob. No edema.  Able to get up w/ assist and walke

## 2017-08-05 NOTE — CONSULTS
Hira James is a 58year old male.    Patient presents with:  Altered Mental Status (neurologic)  Chest Pain Angina (cardiovascular)    HPI:    Pt with syncope    Hpi: pt has h/o CM, woke up on the floor at home, not sure how long he was on the floor fo Laser Lithotripsy, Stone Basket Removal, Rt.  Ureteral Stent Placement - Dr. Sanjay Guillen   • Other surgical history  06/29/2017    Cystoscopy and Stent removal - Dr. Leeroy Osorio   • Catha Douse - cardio (external)         No Known Allergies      No current facility-administered CREATININE      0.70 - 1.30 mg/dL 1.30 1.61 (H) 1.29 1.27   GFR      >=60 58 (L) 45 (L) 59 (L) 61   CALCIUM      8.3 - 10.3 mg/dL 7.9 (L) 8.6 8.6 9.5   ALKALINE PHOSPHATASE      45 - 117 U/L 78 95 122 (H)    AST (SGOT)      15 - 41 U/L 191 (H) 284 (H) 35 minimal chronic microvascular ischemic changes in the cerebral white matter. If there is clinical concern for acute ischemia/infarction, an MRI of the brain would be recommended for   further evaluation.            Dictated by: Kenji Aguilar MD on 8/04/20 NL     Palate: NL     SCM/Trapezius: 5/5     Tongue in the middle    MOTOR FUNCTION:     Tone: NL     Bulk: NL     Strength: decreased right shoulder bc of pain after fall     Abnormal Movement: None    SENSORY FUNCTION:     [place]: NL     Temperature:

## 2017-08-05 NOTE — PROGRESS NOTES
08/05/17 0043 08/05/17 0046 08/05/17 0048   Vital Signs   Pulse 78 95 133   Heart Rate Source Monitor Monitor Monitor   /78 105/72 (!) 74/53   BP Location Right arm Right arm Right arm   BP Method Automatic Automatic Automatic   Patient Position L

## 2017-08-06 LAB
ALBUMIN SERPL-MCNC: 2.3 G/DL (ref 3.5–4.8)
ALP LIVER SERPL-CCNC: 95 U/L (ref 45–117)
ALT SERPL-CCNC: 96 U/L (ref 17–63)
AMMONIA: 23 UMOL/L (ref 11–32)
AST SERPL-CCNC: 143 U/L (ref 15–41)
BILIRUB SERPL-MCNC: 1.4 MG/DL (ref 0.1–2)
BUN BLD-MCNC: 27 MG/DL (ref 8–20)
CALCIUM BLD-MCNC: 7.8 MG/DL (ref 8.3–10.3)
CHLORIDE: 110 MMOL/L (ref 101–111)
CK: 558 IU/L (ref 39–308)
CKMB: 2.5 NG/ML (ref 0–5)
CO2: 20 MMOL/L (ref 22–32)
CREAT BLD-MCNC: 1.12 MG/DL (ref 0.7–1.3)
GLUCOSE BLD-MCNC: 88 MG/DL (ref 70–99)
HAV AB SERPL IA-ACNC: 209 PG/ML (ref 193–986)
M PROTEIN MFR SERPL ELPH: 5.4 G/DL (ref 6.1–8.3)
MB INDEX: <1 % (ref ?–4)
PLATELET # BLD AUTO: 142 10(3)UL (ref 150–450)
POTASSIUM SERPL-SCNC: 4 MMOL/L (ref 3.6–5.1)
SODIUM SERPL-SCNC: 139 MMOL/L (ref 136–144)
TSI SER-ACNC: 1.29 MIU/ML (ref 0.35–5.5)

## 2017-08-06 RX ORDER — MELATONIN
1000 DAILY
Status: DISCONTINUED | OUTPATIENT
Start: 2017-08-06 | End: 2017-08-09

## 2017-08-06 RX ORDER — CYANOCOBALAMIN 1000 UG/ML
1000 INJECTION INTRAMUSCULAR; SUBCUTANEOUS ONCE
Status: COMPLETED | OUTPATIENT
Start: 2017-08-06 | End: 2017-08-06

## 2017-08-06 NOTE — PROGRESS NOTES
[facility] Progress Note        Francena Hammans Patient Status:  Observation    1955 MRN [id number]   [facility] 8NE-A Attending Roshan Moctezuma MD   Hosp Day # 0 PCP María Castro MD     Subjective: 08/05/17   0559  08/06/17   0450   WBC  9.4  7.2   --    HGB  14.8  11.9*   --    HCT  43.5  36.1*   --    PLT  194.0  155.0  142.0*       Chem:  Recent Labs   Lab  08/04/17 2049 08/05/17   0559  08/05/17   1707  08/06/17   0450   NA  138  142   --   13 conditions.         FINDINGS:  Mild atelectasis/scarring at the lung bases. Normal heart size and pulmonary vascularity. No pleural effusion or pneumothorax. No lobar consolidation.  Degenerative changes in the spine.     =====  CONCLUSION:  No lobar pneumo right femoral artery using modified Seldinger technique.   A 7-Japanese introducer sheath was inserted in the right femoral vein.    Right heart catheterization was performed using [place]-Nayeli catheter.  We measured right heart pressures and calculated the car codominant system with no disease angiographically.  The right posterior descending artery and PL branch had no disease angiographically.  Left ventriculogram revealed reduced LV systolic function with LV ejection fraction of 40% with global hypokinesis.   mitral valve regurgitation with reduced LV systolic function to decide if he needs any mitral valve surgery.   4.                    At the current time, he is stable in tune up.      All was discussed with the patient and nurses and Dr. Hunter Levy.         Dict

## 2017-08-06 NOTE — DIETARY NOTE
ADRIANO Jama 46 ASSESSMENT    Pt is at moderate nutrition risk. Pt does not meet malnutrition criteria.     NUTRITION DIAGNOSIS/PROBLEM:    Unintentional weight loss related to inability to consume adequate PO as evidenced by weight los kg  Calories: 6208-4581 calories/day (22-27 calories per kg)  Protein:  grams protein/day (1.2-1.5 grams protein per kg)  Fluid: ~1 ml/kcal or per MD discretion    MONITOR AND EVALUATE/NUTRITION GOALS:    1. PO intake to meet at least 75% patient nut

## 2017-08-06 NOTE — PHYSICAL THERAPY NOTE
PHYSICAL THERAPY TREATMENT NOTE - INPATIENT    Room Number: 6705/6906-E     Session: 1   Number of Visits to Meet Established Goals: 4    Presenting Problem: Syncope/collapse, elevated troponin level, traumatic rhabdomyolysis, and ARF     History related Removal, Rt.                 Ureteral Stent Placement - Dr. Dick Mcgowan  06/29/2017: OTHER SURGICAL HISTORY      Comment: Cystoscopy and Stent removal - Dr. Sammie Cabot  No date: DIONI - CARDIO (EXTERNAL)    SUBJECTIVE  This pain on the chest is worrying me and it hurst so Within Functional Limits  Stoop/Curb Assistance: Not tested  Comment : flexed posturing due to chest pain    Skilled Therapy Provided: Transfer and bed mobility training using log roll technique in sup<sit and use more of the LE with sit<>sand from the rec be placed on restorative for ambulation      DISCHARGE RECOMMENDATIONS  PT Discharge Recommendations: Home (with intermittent assist as needed)     PLAN  PT Treatment Plan: Bed mobility; Body mechanics; Endurance; Energy conservation;Patient education;Gait tr

## 2017-08-06 NOTE — PROCEDURES
[facility], [address] [place] [address]      PATIENT'S NAME: Charlotte Galvez   ATTENDING PHYSICIAN: Latonia García M.D.    PATIENT ACCOUNT #: [de-identified] LOCATION: [address] [place] [address]   MEDICAL RECORD #: [id number] DATE OF ZAKIYA 64:57:39  [facility] [id number]  /

## 2017-08-06 NOTE — PROGRESS NOTES
Michael Ritter is a 58year old male.    Patient presents with:  Altered Mental Status (neurologic)  Chest Pain Angina (cardiovascular)    HPI:    No events overnight  Pt feels \"fine\" x for soreness of chest muscles and right shoulder where he fell    Co photic stimulation were not performed. There is superimposed generalized slowing in the form of delta activity. The delta activity is seen as high-amplitude paroxysmal delta seen intermittently, lasting a few seconds at a time.   There is also occasional Location: Right arm)   Pulse 75   Temp (!) 97.5 °F (36.4 °C) (Oral)   Resp 16   Ht 5' 10\" (1.778 m)   Wt 179 lb 3.7 oz (81.3 kg)   SpO2 96%   BMI 25.72 kg/m²     CONSTITUTIONAL/OTHER     Appearance: well nourished, well developed, no acute distress.      H

## 2017-08-06 NOTE — CONSULTS
58year old male with no events overnight. Some chest wall pain, some confusion    Denies chest pain, shortness of breath, palpitations, lightheadedness, syncope, orthopnea, paroxysmal nocturnal dyspnea, or edema.     Medications:  No current outpatient pr for himself. · NICM - Patient with previous indication for ICD implantation but didn't follow up. Medical records claim it was because of bills. He is currently DNR and as such is not an ICD candidate. Cont cardiac meds.

## 2017-08-06 NOTE — PLAN OF CARE
Alert. Oriented. sr per tele. Still w/ ant chest wall pain. norco given as needed. Ambulated in halls tonight w/ assist and walker. Unsteady gait. phy therapy recommended ac rehab? Kaden Arnold Iv infusing. Denies dizziness or lightheadedness.  Will do orthostatics thi

## 2017-08-06 NOTE — PROGRESS NOTES
[facility] Hospitalist note    PCP: Chelsea Farr MD    Chief Complaint:  59 yo man with h/o dilated cardiomyopathy/mod MR who was reportedly down for several days.      SUBJECTIVE:  Pt rethinking his dnr status once we discussed that aicd would not be fe --    --    GLU  68*  89   --   88       Recent Labs   Lab  08/04/17   2049  08/05/17   0559  08/06/17   0450   ALT  118*  96*  96*   AST  284*  191*  143*   ALB  3.1*  2.7*  2.3*       No results for input(s): PGLU in the last 72 hours.     Recent Labs   L although pattern could be concerning for biliary obstruction, improving. abd u/s with sludge in gallbladder  - trend lfts, improving.     8) chest soreness- seems musculoskeletal, worse with movement.  likely related to being down. ekg jose l [place]

## 2017-08-06 NOTE — PROGRESS NOTES
08/06/17 0356 08/06/17 0358 08/06/17 0359   Vital Signs   Pulse 84 103 112   Heart Rate Source Monitor Monitor Monitor   Cardiac Rhythm NSR --  --    Respiratory Quality Normal --  --    /69 94/64 (!) 88/73   BP Location Right arm Right arm Right

## 2017-08-07 ENCOUNTER — APPOINTMENT (OUTPATIENT)
Dept: MRI IMAGING | Facility: HOSPITAL | Age: 62
DRG: 227 | End: 2017-08-07
Attending: Other
Payer: COMMERCIAL

## 2017-08-07 LAB
ALBUMIN SERPL-MCNC: 2.3 G/DL (ref 3.5–4.8)
ALP LIVER SERPL-CCNC: 98 U/L (ref 45–117)
ALT SERPL-CCNC: 70 U/L (ref 17–63)
AST SERPL-CCNC: 85 U/L (ref 15–41)
BILIRUB SERPL-MCNC: 0.6 MG/DL (ref 0.1–2)
BUN BLD-MCNC: 15 MG/DL (ref 8–20)
CALCIUM BLD-MCNC: 8 MG/DL (ref 8.3–10.3)
CHLORIDE: 112 MMOL/L (ref 101–111)
CO2: 22 MMOL/L (ref 22–32)
CREAT BLD-MCNC: 0.94 MG/DL (ref 0.7–1.3)
GLUCOSE BLD-MCNC: 94 MG/DL (ref 70–99)
M PROTEIN MFR SERPL ELPH: 5.3 G/DL (ref 6.1–8.3)
POTASSIUM SERPL-SCNC: 4 MMOL/L (ref 3.6–5.1)
SODIUM SERPL-SCNC: 142 MMOL/L (ref 136–144)

## 2017-08-07 PROCEDURE — 90792 PSYCH DIAG EVAL W/MED SRVCS: CPT | Performed by: OTHER

## 2017-08-07 PROCEDURE — 70551 MRI BRAIN STEM W/O DYE: CPT | Performed by: OTHER

## 2017-08-07 RX ORDER — SODIUM CHLORIDE 9 MG/ML
INJECTION, SOLUTION INTRAVENOUS CONTINUOUS
Status: DISCONTINUED | OUTPATIENT
Start: 2017-08-07 | End: 2017-08-09

## 2017-08-07 RX ORDER — CARVEDILOL 3.12 MG/1
3.12 TABLET ORAL 2 TIMES DAILY WITH MEALS
Status: DISCONTINUED | OUTPATIENT
Start: 2017-08-07 | End: 2017-08-09

## 2017-08-07 RX ORDER — LISINOPRIL 2.5 MG/1
2.5 TABLET ORAL DAILY
Status: DISCONTINUED | OUTPATIENT
Start: 2017-08-07 | End: 2017-08-09

## 2017-08-07 RX ORDER — CHLORHEXIDINE GLUCONATE 4 G/100ML
30 SOLUTION TOPICAL
Status: COMPLETED | OUTPATIENT
Start: 2017-08-08 | End: 2017-08-08

## 2017-08-07 NOTE — PROGRESS NOTES
[facility]  Progress Note    Cleo Peters Patient Status:  Observation    1955 MRN [id number]   [facility] 8NE-A Attending Michael Blankenship MD   Hosp Day # 0 PCP Coni Varner MD     Subjective:   No chest pa Lungs are clear to auscultation bilaterally. There are no focal rales, rhonchi, or wheezes. Good air movement is noted throughout both lung fields. Abdomen: The abdomen is soft, non-distended, and non-tender. Bowel sounds are present and normoactive. parameters are consistent with abnormal left ventricular relaxation -     grade 1 diastolic dysfunction. 2. Aortic valve: Mild regurgitation. 3. Mitral valve: There was mild to moderate regurgitation.   4. Left atrium: The left atrial volume was normal.

## 2017-08-07 NOTE — PAYOR COMM NOTE
--------------  ADMISSION REVIEW     Payor: [address] [place] [address] POS/DEMETRIA  Subscriber #:  [id number]  Authorization Number: N/A    Admit date: 8/5/17  Admit time: [address]       Admitting Physician: Tri Jaime MD  Attending Physician:  Tri Jaime MD  Primary Care Dr. Kurtis Galvez  06/27/2017: OTHER SURGICAL HISTORY      Comment: Cysto, RPG, Rt. Ureteral Dilation, Rt. URS w/                Laser Lithotripsy, Stone Basket Removal, Rt.                 Ureteral Stent Placement - Dr. Kurtis Galvez  06/29/2017: OTHER SURGICAL HISTORY tenderness with bending and twisting of the torso and movement of the upper extremities. Abdomen: Soft and nontender. No abdominal masses.   No peritoneal signs   Extremities: no edema, normal peripheral pulses   Neuro: Alert oriented and nonfocal   Skin: chest pain. Oriented to  person and place. FINDINGS:  Ventricles and sulci are within normal limits. There is no midline shift or mass-effect. The basal cisterns are patent. The gray-white matter differentiation is intact.   There is no acute intrac and within range for rhabdomyolysis. IV fluids in process. Patient's troponin is also elevated at 0.11. Discussed with [facility] cardiology who will follow. At this time cardiology does not recommend any anticoagulation.   Patient received aspirin by EMS in r °F (36.6 °C) (Oral)   Resp 22   Ht 177.8 cm (5' 10\")   Wt 167 lb 5.3 oz (75.9 kg)   SpO2 96%   BMI 24.01 kg/m²      Gen- NAD, appears stated age  [de-identified]- NCAT, anicteric sclera, MMM, OP clear  Lymph- no cervical LAD  CV- RRR no murmurs.  No CORBIN  Lungs- CTAB with MR, EF 30%  - cards following, plan to repeat echo  - hold BP meds for now[RJ.1]    6)[RJ.2] CKD, possible RENÉE component- baseline Cr 1.2-1.4 mostly.  Now at baseline after IVF[RJ.1]    7)[RJ.2] Abn LFTs- ?reflective of muscle injury[RJ.1] although pat (Left Lower Abdomen) Jaylen Alaniz RN    8/6/2017 2026 Given 5000 Units Subcutaneous (Left Lower Abdomen) Jaylen Alaniz RN    8/6/2017 1517 Given 5000 Units Subcutaneous (Left Lower Abdomen) Madison Chaudhari RN      HYDROcodone-acetaminophen [facility] AND [facility]

## 2017-08-07 NOTE — CONSULTS
[facility]    PATIENT'S NAME: Flaca Nick   ATTENDING PHYSICIAN: Jatinder Roldan M.D.   CONSULTING PHYSICIAN: Leanna Billings Psy.D.   PATIENT ACCOUNT#:   [de-identified]    LOCATION:  [address] [place] [address]  MEDICAL RECORD #:   [id number]       [address] [facility] RESULTS:  Overall, the patient's profile to some degree presents as an early stage early onset Alzheimer disease process as there is measurable memory loss with relatively intact executive process. There is some variability in this profile.   In my clinica comprehended the task at hand, he then performed it with a score of 55 (mean of 50). This is certainly in the normal limit range moving towards above average. He is able to follow written instructions with 90% accuracy.   Confrontational naming and word that of an Alzheimer disease process. RECOMMENDATIONS:  Certainly the patient's objective data reveals an early stage of an Alzheimer disease process.   There is a great degree of psychiatric factors, but the patient's overall insight makes it difficult

## 2017-08-07 NOTE — CONSULTS
[facility]  Report of Psychiatric Consultation    Cleo Peters Patient Status:  Inpatient    1955 MRN [id number]   [facility] 8NE-A Attending Michael Blankenship MD   Hosp Day # 2 PCP Coni Varner MD     Date of Admission:   process. \" Patient admits to having anxiety about his health issues due to not having a clear explanation for his suspected syncopal episode.  He is also worried about his chest pain, even though it is reproducible by palpitation, and not from acute coronar Problem Relation Age of Onset   • Cancer Father    • Stroke Father    • Cancer Mother    • Heart Disorder Mother      Heart attack   • Heart Attack Paternal Uncle       reports that he has never smoked.  He has never used smokeless tobacco. He reports abbi and Concentration:   fair  Memory:  intact recent and intact remote  Language: Intact naming and repetition  Fund of Knowledge: Able to recite president of [place]     Insight: fair  Judgment: fair    Laboratory Data:    Lab Results  Component Value Date   CRE

## 2017-08-07 NOTE — PROGRESS NOTES
58year old male with no events overnight. Seen be psych and deemed depressed but capable of making medical decisions. He would like an ICD placed and is aware he can't be DNR to do so.     Has MSK chest pain - denies shortness of breath, palpitations, li follow through with the outpatietn ICD placement we had scheduled he said he was scared, but this hospitalization has made him want to do a better job caring for himself and getting the medical treatments he thinks he needs.  After much deliberation he woul

## 2017-08-07 NOTE — PROGRESS NOTES
PSYCH CONSULT    Date of Admission:  8/4/17  Date of Consult: 8/7/17  Reason for Consultation:  Eval for anxiety, depression    Impression: He has anxiety and depressed mood about his health issues- namely the heart failure and syncopal episode.  He feels

## 2017-08-07 NOTE — PROGRESS NOTES
Yarely Swetha Hospitalist note    PCP: Matthew Lopez MD    Chief Complaint:  57 yo man with h/o dilated cardiomyopathy/mod MR who was reportedly down for several days. SUBJECTIVE:  Pt seeing \"flashing lights\" this AM. Denies HA, nausea, fever, chills. 68*  89   --   88  94     Recent Labs   Lab  08/04/17 2049 08/05/17   0559  08/06/17   0450  08/07/17   0415   ALT  118*  96*  96*  70*   AST  284*  191*  143*  85*   ALB  3.1*  2.7*  2.3*  2.3*     Recent Labs   Lab  08/04/17 2049 08/05/17   0559  0 gallbladder  - trend lfts, improving.     # Chest soreness  - reproducible  - lengthy d/w patient regarding his reproducible pain. Likely costochondritis. Pt aware this can take weeks to months to fully resolve.      # Vision changes  - ?brief episode of se

## 2017-08-07 NOTE — OCCUPATIONAL THERAPY NOTE
OCCUPATIONAL THERAPY EVALUATION - INPATIENT     Room Number: 5169/7750-L  Evaluation Date: 8/7/2017  Type of Evaluation: Initial  Presenting Problem: Elevated troponin, fall w.unconsciousness with uncertainlty of length     Physician Order: IP Consult to O • Biventricular heart failure (HCC)    • Bowel obstruction (HCC)    • Calculus of kidney    • Congestive heart disease ([facility] [address]     no recent exacerbation   • Genitourinary disease    • KIDNEY STONE    • Lupus (HCC)    • OTHER DISEASES     systemic lupus attention  Orientation Level:  oriented to place and oriented to person  Memory:  decreased recall of biographical information, decreased recall of precautions, decreased recall of recent events, decreased long term memory and decreased short term memory Movement; Coordination - Finger Opposition  Coordination - Finger to Nose: Right decreased accuracy; Right decreased speed  Coordination - Rapid Alternating Movement: Right decreased accuracy; Right decreased speed  Coordination - Finger Opposition: Right dec Syncope/collapse, elevated troponin level, traumatic rhabdomyolysis, and ARF.   In this OT evaluation patient presents with the following impairments: decreased ADL/IADL independence, decreased activity tolerance/endurance, decreased mobility/transfers, inc Goals  Patient will perform all functional transfers:  with modified independent    UE Exercise Program Goal  Patient will be supervision with bilateral AROM HEP (home exercise program).     Additional Goals:  Pt will return verbalize at least 3 energy cons

## 2017-08-08 ENCOUNTER — APPOINTMENT (OUTPATIENT)
Dept: INTERVENTIONAL RADIOLOGY/VASCULAR | Facility: HOSPITAL | Age: 62
DRG: 227 | End: 2017-08-08
Attending: INTERNAL MEDICINE
Payer: COMMERCIAL

## 2017-08-08 PROCEDURE — 0JH608Z INSERTION OF DEFIBRILLATOR GENERATOR INTO CHEST SUBCUTANEOUS TISSUE AND FASCIA, OPEN APPROACH: ICD-10-PCS | Performed by: INTERNAL MEDICINE

## 2017-08-08 PROCEDURE — 02HK3KZ INSERTION OF DEFIBRILLATOR LEAD INTO RIGHT VENTRICLE, PERCUTANEOUS APPROACH: ICD-10-PCS | Performed by: INTERNAL MEDICINE

## 2017-08-08 RX ORDER — ACETAMINOPHEN AND CODEINE PHOSPHATE 300; 30 MG/1; MG/1
1 TABLET ORAL EVERY 4 HOURS PRN
Status: DISCONTINUED | OUTPATIENT
Start: 2017-08-08 | End: 2017-08-09

## 2017-08-08 RX ORDER — BACITRACIN 50000 [USP'U]/1
INJECTION, POWDER, LYOPHILIZED, FOR SOLUTION INTRAMUSCULAR
Status: COMPLETED
Start: 2017-08-08 | End: 2017-08-08

## 2017-08-08 RX ORDER — ONDANSETRON 2 MG/ML
4 INJECTION INTRAMUSCULAR; INTRAVENOUS EVERY 6 HOURS PRN
Status: DISCONTINUED | OUTPATIENT
Start: 2017-08-08 | End: 2017-08-09

## 2017-08-08 RX ORDER — ACETAMINOPHEN 325 MG/1
650 TABLET ORAL EVERY 4 HOURS PRN
Status: DISCONTINUED | OUTPATIENT
Start: 2017-08-08 | End: 2017-08-09

## 2017-08-08 RX ORDER — LIDOCAINE HYDROCHLORIDE 10 MG/ML
INJECTION, SOLUTION INFILTRATION; PERINEURAL
Status: COMPLETED
Start: 2017-08-08 | End: 2017-08-08

## 2017-08-08 RX ORDER — ACETAMINOPHEN AND CODEINE PHOSPHATE 300; 30 MG/1; MG/1
2 TABLET ORAL EVERY 4 HOURS PRN
Status: DISCONTINUED | OUTPATIENT
Start: 2017-08-08 | End: 2017-08-09

## 2017-08-08 RX ORDER — MIDAZOLAM HYDROCHLORIDE 1 MG/ML
INJECTION INTRAMUSCULAR; INTRAVENOUS
Status: COMPLETED
Start: 2017-08-08 | End: 2017-08-08

## 2017-08-08 NOTE — PROCEDURES
Defibrillator Implantation    History:  58year old male with NICM, EF < 35% NYHA 2 sx's despite maximal medical tx who presents for a SCICD implant.  The risks and benefits of the procedure (including, but not limited to, hematoma, infection, pneumothora Sensing(mV) Impedance Pacing   Generator Medtronic [id number]               RV ( Medtronic [id number] 8 605 0.5 @ 0.5ms                         Conclusions:  · Successful single chamber defibrillator implant   · Adequate  RV sensing and pacing

## 2017-08-08 NOTE — PHYSICAL THERAPY NOTE
Attempted to see Pt this am for PT session, however, Pt occupied with other services. Followed up this afternoon and Pt down for AICD placement. Will follow up at later date.

## 2017-08-08 NOTE — PROGRESS NOTES
Marjan Carter is a 58year old male.    Patient presents with:  Altered Mental Status (neurologic)  Chest Pain Angina (cardiovascular)    HPI:    Neuro fu note    Pt stable    Mri brain no acute changes  eeg no evidence of seizure  Appreciate psych and ne PATIENT ACCOUNT #: [de-identified] LOCATION: [address] [place] [address]   MEDICAL RECORD #: [id number] YOB: 1955   DATE OF SERVICE: 08/05/2017          ELECTROENCEPHALOGRAM REPORT     DATE OF EXAMINATION:  08/05/2017  AGE: 58 Yrs.    SEX: M   EEG #: 17-11 PM by Troy Ruiz MD   Document history: Transcription ([id number]) on 8/5/2017 10:41 PM by Troy Ruiz MD              Allergies:  No Known Allergies   Current Meds:    No current outpatient prescriptions on file.        ROS:   Denies ha

## 2017-08-08 NOTE — PROGRESS NOTES
[facility] [address] [place] [address] Cardiology  Progress Note    Ja Troy Patient Status:  Observation    1955 MRN [id number]   [facility] 8NE-A Attending Carroll Butt MD   Hosp Day # 3 PCP Marcelle Lopez MD     Subjective:   No chest pa S3 or S4. There are no murmurs, rubs, or gallops. No click is appreciated. PMI is nondisplaced with a normal apical impulse. Pulmonary:  Lungs are clear to auscultation bilaterally. There are no focal rales, rhonchi, or wheezes.   Good air movement i secondary to rhabdomyolysis  3. Periods of unresponsiveness reported   -Not c/w LOC  4. Mild-Mod MR  5. Orthostatic hypotension  6. Chronic systolic CHF    -Compensated  7. NSVT    Plan:   1. Coreg 3.125mg PO BID  2. Lisinopril 2.5mg PO qAM  3.   ICD

## 2017-08-08 NOTE — PROGRESS NOTES
Elen[facility] Doshi Hospitalist note    PCP: Teresa Mercedes MD    Chief Complaint:  59 yo man with h/o dilated cardiomyopathy/mod MR who was reportedly down for several days.      SUBJECTIVE:  Pt taken for ICD today    OBJECTIVE:  Temp:  [97.6 °F (36.4 °C)-98.1 °F following, has sig history of NICM, so concern for cardiogenic cause. However, story seems strange and inconsistent as well. Possible secondary seizure after syncope.  Perhaps amnestic episode as well?  - neuropsych consult appreciated     # Hypotension- li

## 2017-08-09 ENCOUNTER — APPOINTMENT (OUTPATIENT)
Dept: GENERAL RADIOLOGY | Facility: HOSPITAL | Age: 62
DRG: 227 | End: 2017-08-09
Attending: INTERNAL MEDICINE
Payer: COMMERCIAL

## 2017-08-09 VITALS
HEART RATE: 80 BPM | HEIGHT: 70 IN | OXYGEN SATURATION: 97 % | SYSTOLIC BLOOD PRESSURE: 111 MMHG | BODY MASS INDEX: 26.16 KG/M2 | TEMPERATURE: 98 F | DIASTOLIC BLOOD PRESSURE: 70 MMHG | RESPIRATION RATE: 20 BRPM | WEIGHT: 182.75 LBS

## 2017-08-09 LAB
BUN BLD-MCNC: 9 MG/DL (ref 8–20)
CALCIUM BLD-MCNC: 8.2 MG/DL (ref 8.3–10.3)
CHLORIDE: 108 MMOL/L (ref 101–111)
CO2: 24 MMOL/L (ref 22–32)
CREAT BLD-MCNC: 0.97 MG/DL (ref 0.7–1.3)
GLUCOSE BLD-MCNC: 102 MG/DL (ref 70–99)
HAV IGM SER QL: 1.9 MG/DL (ref 1.7–3)
POTASSIUM SERPL-SCNC: 4.3 MMOL/L (ref 3.6–5.1)
SODIUM SERPL-SCNC: 139 MMOL/L (ref 136–144)

## 2017-08-09 PROCEDURE — 71020 XR CHEST PA + LAT CHEST (CPT=71020): CPT | Performed by: INTERNAL MEDICINE

## 2017-08-09 RX ORDER — FUROSEMIDE 20 MG/1
20 TABLET ORAL DAILY
Qty: 30 TABLET | Refills: 3 | Status: SHIPPED | OUTPATIENT
Start: 2017-08-09 | End: 2018-08-14

## 2017-08-09 RX ORDER — ACETAMINOPHEN 325 MG/1
650 TABLET ORAL EVERY 4 HOURS PRN
Qty: 30 TABLET | Refills: 0 | Status: SHIPPED | COMMUNITY
Start: 2017-08-09 | End: 2018-03-15

## 2017-08-09 RX ORDER — FUROSEMIDE 20 MG/1
20 TABLET ORAL EVERY OTHER DAY
Status: DISCONTINUED | OUTPATIENT
Start: 2017-08-09 | End: 2017-08-09

## 2017-08-09 RX ORDER — CARVEDILOL 3.12 MG/1
3.12 TABLET ORAL 2 TIMES DAILY WITH MEALS
Qty: 60 TABLET | Refills: 3 | Status: SHIPPED | OUTPATIENT
Start: 2017-08-09 | End: 2017-12-05

## 2017-08-09 RX ORDER — LISINOPRIL 2.5 MG/1
2.5 TABLET ORAL DAILY
Qty: 30 TABLET | Refills: 3 | Status: SHIPPED | OUTPATIENT
Start: 2017-08-09 | End: 2017-12-04

## 2017-08-09 NOTE — CM/SW NOTE
08/09/17 1600   Discharge disposition   Discharged to: Home-Health   Name of Facillity/Home Care/Hospice Residential   Discharge transportation Private car

## 2017-08-09 NOTE — PHYSICAL THERAPY NOTE
PHYSICAL THERAPY TREATMENT NOTE - INPATIENT    Room Number: 2704/7257-S     Session: 2   Number of Visits to Meet Established Goals: 4    Presenting Problem: Syncope/collapse, elevated troponin level, traumatic rhabdomyolysis, and ARF     History related URS w/                Laser Lithotripsy, Stone Basket Removal, Rt.                 Ureteral Stent Placement - Dr. Mariano Stephens  06/29/2017: OTHER SURGICAL HISTORY      Comment: Cystoscopy and Stent removal - Dr. Latasha Pizarro  No date: DIONI - CARDIO (EXTERNAL)    SUBJECTIVE Provided:     Pt presented in supine upon PT arrival. Pt willing to participate in session, working towards increased functional mobility and independence.   Pt performed supine<>sit transfer with c CGA level for safety and amb in [facility] AD - see abov Good  Frequency (Obs): Daily    CURRENT GOALS      Goal #1 Patient is able to demonstrate supine - sit EOB @ level: supervision  Min 8/6/2017     Goal #2 Patient is able to demonstrate transfers Sit to/from Stand at assistance level: supervision  Min 8/6/2

## 2017-08-09 NOTE — PLAN OF CARE
NURSING DISCHARGE NOTE      Pt discharged to home w/ family in stable condition. ICD discharge instructions provided and reviewed w/ pt. Prescriptions, medication list, and follow-up appointments provided and reviewed w/ pt.    Pt stated understandi

## 2017-08-09 NOTE — CONSULTS
Pt. Is a 58year old man who was referred for bedside therapy with symptoms of anxiety and depression. He was seen bedside this morning. His mood was depression and his affect sad.  He was at times during our conversation, on the verge of tears, and he pres

## 2017-08-09 NOTE — OCCUPATIONAL THERAPY NOTE
OCCUPATIONAL THERAPY TREATMENT NOTE - INPATIENT     Room Number: 3147/0616-T  Session: 1  Number of Visits to Meet Established Goals: 5    Presenting Problem: Elevated troponin, fall w.unconsciousness with uncertainlty of length     History related to curr region -PM site  Management Techniques: Relaxation     ACTIVITY TOLERANCE  Room air  No shortness of breath    ACTIVITIES OF DAILY LIVING ASSESSMENT  AM-PAC ‘6-Clicks’ Inpatient Daily Activity Short Form  How much help from another person does the patient deficits manifest functionally while performing all mobility and aDL's.   DC recommendation now at 24 hour care/supervision and HHOT due to patient's in ability/limitation of use of LUE currently and need for additional support in home    OT Discharge Recom

## 2017-08-09 NOTE — PLAN OF CARE
Pt s/p ICD placement. Likely discharge today when cleared by Dr. Chan. Pt A&O, occasionally forgetful. Mild pain at L chest incision site. VSS- on RA.  NSR on tele w/ frequent PVCs. L chest incision CDI, soft. Foam in place.   Pt and family updat

## 2017-08-09 NOTE — CM/SW NOTE
BERRY order for d/c plans. Met with pt, brother and sister-in-law. Pt reporting that he will be discharged later today and asked about HHC. Pt lives alone and PTA was completely independent.  His brother/JASSON are in town from [place] and will be staying with p

## 2017-08-09 NOTE — PROGRESS NOTES
[facility] Cardiology  Progress Note    Miguel Angel Capellan Patient Status:  Observation    1955 MRN [id number]   [facility] 8NE-A Attending June Hooper MD   Hosp Day # 4 PCP Masoud Perez MD     Subjective:   No signific There is normal S1, S2. There is no S3 or S4. There are no murmurs, rubs, or gallops. No click is appreciated. PMI is nondisplaced with a normal apical impulse. Pulmonary:  Lungs are clear to auscultation bilaterally.   There are no focal rales, rhon rhabdomyolysis  3. Periods of unresponsiveness reported   -Not c/w LOC  4. Mild-Mod MR  5. Orthostatic hypotension  6. Chronic systolic CHF    -Compensated  7. NSVT   -S/P ICD 8/8/17    Plan:   1. Coreg 3.125mg PO BID  2. Lisinopril 2.5mg PO qAM  3.

## 2017-08-09 NOTE — PLAN OF CARE
Pt rec'd s/p AICD  Placement to (L) chest.  Site with dressing C/D/I, sling in place. and good CMS noted  Pt denies any distress or discomfort at this time.  Unable to get IV access on (L) therefore (R)  Femoral approach for venous access, sheath removed , m

## 2017-08-10 NOTE — PAYOR COMM NOTE
--------------  CONTINUED STAY REVIEW    Payor: Liliana Garvey POS/DEMETRIA  Subscriber #:  [id number]  Authorization Number: [id number]    Admit date: 8/5/17  Admit time: [address]    Admitting Physician: Wesley Patel MD  Attending Physician:  No att. providers found

## 2017-08-10 NOTE — PAYOR COMM NOTE
--------------  DISCHARGE REVIEW    Payor: Sri MARTELL/DEMETRIA  Subscriber #:  [id number]  Authorization Number: [id number]    Admit date: 8/5/17  Admit time:  0027  Discharge Date: 8/9/2017  4:20 PM     Admitting Physician: Tonya Barboza MD  Attending y

## 2017-08-21 PROBLEM — Z95.810 ICD (IMPLANTABLE CARDIOVERTER-DEFIBRILLATOR) IN PLACE: Status: ACTIVE | Noted: 2017-08-21

## 2017-08-26 NOTE — DISCHARGE SUMMARY
General Medicine Discharge Summary     Patient ID:  Massiel Nixon  58year old  6/8/1955    Admit date: 8/4/2017    Discharge date and time: 8/9/2017  4:20 PM     Attending Physician: Tere att. providers f on cxr. UA had been ordered but not done  - abd u/s with only sludge in gallbladder. LFTs downtrending.  Unclear if there was real biliary pathology that could have contributed     # Elevated CK- 2/2 syncope/found down for unclear period of time  - on IVF, Technologist)  Patient shared he had a pacemaker done yesterday. Patient states he has an upset stomach and felt dizzy this am.    FINDINGS:  Interval placement of left-sided AICD. Cardiac size and pulmonary vasculature are within normal limits.  Probable t recommended for further evaluation.     Dictated by: Chris Herron MD on 8/04/2017 at 23:01     Approved by: Chris Herron MD            Us Abdomen Complete (cpt=76700)    Result Date: 8/5/2017  PROCEDURE:  US ABDOMEN COMPLETE (CPT=76700)  COMPARISON: abnormal parenchymal gradient susceptibility. Minimal chronic microvascular ischemic changes in the cerebral white matter. There is no restricted diffusion to suggest acute ischemia/infarction. Trace ethmoid and maxillary mucosal thickening.  The remainder mouth 2 (two) times daily with meals. , Normal, Disp-60 tablet, R-3      CONTINUE these medications which have CHANGED    lisinopril 2.5 MG Oral Tab  Take 1 tablet (2.5 mg total) by mouth daily. , Normal, Disp-30 tablet, R-3    furosemide 20 MG Oral Tab  Alejandro Falcon

## 2017-09-14 PROBLEM — T79.6XXA TRAUMATIC RHABDOMYOLYSIS, INITIAL ENCOUNTER (HCC): Status: RESOLVED | Noted: 2017-08-05 | Resolved: 2017-09-14

## 2017-09-14 PROBLEM — R77.8 TROPONIN LEVEL ELEVATED: Status: RESOLVED | Noted: 2017-08-04 | Resolved: 2017-09-14

## 2017-09-14 PROBLEM — N17.9 ACUTE RENAL FAILURE, UNSPECIFIED ACUTE RENAL FAILURE TYPE (HCC): Status: RESOLVED | Noted: 2017-08-05 | Resolved: 2017-09-14

## 2017-09-14 PROBLEM — N20.1 RIGHT URETERAL STONE: Status: RESOLVED | Noted: 2017-04-10 | Resolved: 2017-09-14

## 2017-09-14 PROBLEM — R55 SYNCOPE AND COLLAPSE: Status: RESOLVED | Noted: 2017-08-05 | Resolved: 2017-09-14

## 2017-09-14 PROCEDURE — 81003 URINALYSIS AUTO W/O SCOPE: CPT | Performed by: INTERNAL MEDICINE

## 2017-10-30 ENCOUNTER — APPOINTMENT (OUTPATIENT)
Dept: LAB | Age: 62
End: 2017-10-30
Attending: INTERNAL MEDICINE
Payer: COMMERCIAL

## 2017-10-30 DIAGNOSIS — I34.0 NON-RHEUMATIC MITRAL REGURGITATION: ICD-10-CM

## 2017-10-30 DIAGNOSIS — Z95.810 ICD (IMPLANTABLE CARDIOVERTER-DEFIBRILLATOR) IN PLACE: ICD-10-CM

## 2017-10-30 DIAGNOSIS — I42.0 NONISCHEMIC DILATED CARDIOMYOPATHY (HCC): ICD-10-CM

## 2017-10-30 DIAGNOSIS — I50.22 CHRONIC SYSTOLIC CHF (CONGESTIVE HEART FAILURE) (HCC): ICD-10-CM

## 2017-10-30 PROCEDURE — 80048 BASIC METABOLIC PNL TOTAL CA: CPT

## 2017-10-30 PROCEDURE — 36415 COLL VENOUS BLD VENIPUNCTURE: CPT

## 2017-11-22 ENCOUNTER — APPOINTMENT (OUTPATIENT)
Dept: LAB | Age: 62
End: 2017-11-22
Attending: INTERNAL MEDICINE
Payer: COMMERCIAL

## 2017-11-22 DIAGNOSIS — Z92.29 HISTORY OF REGULAR MEDICATION USE: ICD-10-CM

## 2017-11-22 PROCEDURE — 80048 BASIC METABOLIC PNL TOTAL CA: CPT

## 2017-11-22 PROCEDURE — 36415 COLL VENOUS BLD VENIPUNCTURE: CPT

## 2018-09-18 PROBLEM — R73.01 IFG (IMPAIRED FASTING GLUCOSE): Status: ACTIVE | Noted: 2018-09-18

## 2018-09-18 PROBLEM — G47.00 INSOMNIA: Status: ACTIVE | Noted: 2018-09-18

## 2018-09-18 PROCEDURE — 81003 URINALYSIS AUTO W/O SCOPE: CPT | Performed by: INTERNAL MEDICINE

## 2024-10-08 NOTE — ED INITIAL ASSESSMENT (HPI)
ENRIQUE requested
Pt currently have right sided flank pain and pelvis discomfort. Pt also notes some dizziness. Pt recently dx with kidney stones.
4-10 METS

## (undated) DEVICE — SOL  .9 3000ML

## (undated) DEVICE — 365 SINGLE USE LASER FIBER

## (undated) DEVICE — SEAL Y BIOPSY PORT P6R SCOPE

## (undated) DEVICE — NITINOL STONE RETRIEVAL BASKET: Brand: ZERO TIP

## (undated) DEVICE — ZIPWIRE GUIDEWIRE .038X150 STR

## (undated) DEVICE — KENDALL SCD EXPRESS SLEEVES, KNEE LENGTH, MEDIUM: Brand: KENDALL SCD

## (undated) DEVICE — CYSTO CDS-LF: Brand: MEDLINE INDUSTRIES, INC.

## (undated) DEVICE — ZIPWIRE GUIDEWIRE .035X150 STR

## (undated) DEVICE — HYDROGEL COATED URETERAL DILATOR: Brand: NOTTINGHAM ONE-STEP

## (undated) DEVICE — GLOVE SURG SENSICARE SZ 7

## (undated) DEVICE — Device

## (undated) NOTE — LETTER
[facility]  [address] [facility], [address] [place] [address]    Consent for Operation    Date: __________________    Time: _______________    1.  I authorize the performance upon Rickey Albrecht the following operation:    Procedure(s):  CYSTOSCOPY, RIGHT UR procedure has been videotaped, the surgeon will obtain the original videotape. The hospital will not be responsible for storage or maintenance of this tape.     6. For the purpose of advancing medical education, I consent to the admittance of observers to t STATEMENTS REQUIRING INSERTION OR COMPLETION WERE FILLED IN.     Signature of Patient:   ___________________________    When the patient is a minor or mentally incompetent to give consent:  Signature of person authorized to consent for patient: ____________ drugs/illegal medications). Failure to inform my anesthesiologist about these medicines may increase my risk of anesthetic complications. · If I am allergic to anything or have had a reaction to anesthesia before.     3. I understand how the anesthesia med I have discussed the procedure and information above with the patient (or patient’s representative) and answered their questions. The patient or their representative has agreed to have anesthesia services.     _______________________________________________

## (undated) NOTE — ED AVS SNAPSHOT
[facility] Emergency Department    [facility]  [address] [address]    Phone:  [phone]    Fax:  [phone]           Nicolasa Walker   MRN: [id number]    Department:  [facility] Emergency Department   Date of Visit:  4/7/ Quantity:  30 tablet   Commonly known as:  MOTRIN   Take 1 tablet (600 mg total) by mouth every 8 (eight) hours as needed for Pain or Fever.          CHANGE how you take these medications     hydrocodone-acetaminophen 7.5-325 MG Tabs   Quantity:  20 tablet You were examined and treated today on an urgent basis only. This was not a substitute for ongoing medical care. Often, one Emergency Department visit does not uncover every injury or illness.  If you have been referred to a primary care or a specialist ph Kimmy [address] ([address]) [address]  Vin Stephan ([facility] [phone] [address] ([address]) [phone]                Additional Information       We are concerned for your o BOWEL GAS PATTERN:  Normal.  No abnormal dilation or deviation. CALCIFICATIONS:  None significant. OTHER:  Negative. No abnormal gaseous collections.                MyChart

## (undated) NOTE — ED AVS SNAPSHOT
[facility] Emergency Department    [facility]  [address] [address]    Phone:  [phone]    Fax:  [phone]           Isaiah Luciana   MRN: [id number]    Department:  [facility] Emergency Department   Date of Visit:  4/6/ Follow the directions for taking your medications provided by your doctor. Please ask your health care provider, pharmacist or nurse if you have any questions regarding your home medications, including potential side effects.               Medication List nuestro adminstrador de asafmaggie mata [phone]    Expect to receive an electronic request (by e-mail or text) to complete a self-assessment the day after your visit. You may also receive a call from our patient liason soon after your visit.  Also, some p [facility] ([address]) [address]  ([address]) [address] [facility]. ([address]) [address] [place] [address] 30. (8 Narrative:    PROCEDURE:  CT ABDOMEN+PELVIS KIDNEYSTONE 2D RNDR(NO IV,NO ORAL)(CPT=74176)     COMPARISON:  GENNARO , CT ABDOMEN/PELVIS KIDNEYSTONE WITH 3D (JRG=75726/51073), 10/06/2014, 10:09.      INDICATIONS:  abd pain     TECHNIQUE:  Unenhanced mul

## (undated) NOTE — ED AVS SNAPSHOT
[facility] Emergency Department    [facility]  [address] [address]    Phone:  [phone]    Fax:  [phone]           Doug Rai   MRN: [id number]    Department:  [facility] Emergency Department   Date of Visit:  4/7/ IF THERE IS ANY CHANGE OR WORSENING OF YOUR CONDITION, CALL YOUR PRIMARY CARE PHYSICIAN AT ONCE OR RETURN IMMEDIATELY TO THE EMERGENCY DEPARTMENT.     If you have been prescribed any medication(s), please fill your prescription right away and begin taking t

## (undated) NOTE — ED AVS SNAPSHOT
[facility] Emergency Department    [place]  [address] [address]    Phone:  [phone]    Fax:  [phone]           Shelia Doshi   MRN: [id number]    Department:  [facility] Emergency Department   Date of Visit:  4/6/ IF THERE IS ANY CHANGE OR WORSENING OF YOUR CONDITION, CALL YOUR PRIMARY CARE PHYSICIAN AT ONCE OR RETURN IMMEDIATELY TO THE EMERGENCY DEPARTMENT.     If you have been prescribed any medication(s), please fill your prescription right away and begin taking t

## (undated) NOTE — LETTER
Consent to Procedure/Sedation    Date: __________________    Time: _______________    1. I authorize the performance upon [place] Level the following:    Insertion of Biventricular Automatic Implantable Cardioverter Defibrillator    2.  I authorize Dr. Shae Martinez Signature of person authorized to consent for patient: Relationship to patient:  ___________________________    ___________________    Witness: ____________________     Date: ______________    Printed: 8/7/2017   5:52 PM    Patient Name: Julita Moore

## (undated) NOTE — ED AVS SNAPSHOT
[facility] Emergency Department  [place]  [address] [address]  Phone:  [phone]  Fax:  [phone]          Massiel Hussain   MRN: [id number]    Department:  [facility] Emergency Department   Date of Visit:  6/25/2017 IF THERE IS ANY CHANGE OR WORSENING OF YOUR CONDITION, CALL YOUR PRIMARY CARE PHYSICIAN AT ONCE OR RETURN IMMEDIATELY TO THE EMERGENCY DEPARTMENT.     If you have been prescribed any medication(s), please fill your prescription right away and begin taking t